# Patient Record
Sex: MALE | Race: WHITE | NOT HISPANIC OR LATINO | Employment: FULL TIME | URBAN - METROPOLITAN AREA
[De-identification: names, ages, dates, MRNs, and addresses within clinical notes are randomized per-mention and may not be internally consistent; named-entity substitution may affect disease eponyms.]

---
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
## 2020-03-15 ENCOUNTER — RECORDS - HEALTHEAST (OUTPATIENT)
Dept: ADMINISTRATIVE | Facility: OTHER | Age: 20
End: 2020-03-15

## 2021-05-27 ENCOUNTER — RECORDS - HEALTHEAST (OUTPATIENT)
Dept: ADMINISTRATIVE | Facility: CLINIC | Age: 21
End: 2021-05-27

## 2023-10-07 ENCOUNTER — HOSPITAL ENCOUNTER (EMERGENCY)
Facility: HOSPITAL | Age: 23
Discharge: HOME | End: 2023-10-07
Attending: EMERGENCY MEDICINE | Admitting: EMERGENCY MEDICINE
Payer: COMMERCIAL

## 2023-10-07 VITALS
OXYGEN SATURATION: 98 % | DIASTOLIC BLOOD PRESSURE: 69 MMHG | HEART RATE: 73 BPM | WEIGHT: 145 LBS | SYSTOLIC BLOOD PRESSURE: 140 MMHG | HEIGHT: 71 IN | BODY MASS INDEX: 20.3 KG/M2 | RESPIRATION RATE: 20 BRPM | TEMPERATURE: 97.7 F

## 2023-10-07 DIAGNOSIS — S61.215A LACERATION OF LEFT RING FINGER WITHOUT FOREIGN BODY WITHOUT DAMAGE TO NAIL, INITIAL ENCOUNTER: ICD-10-CM

## 2023-10-07 PROCEDURE — 99283 EMERGENCY DEPT VISIT LOW MDM: CPT

## 2023-10-07 PROCEDURE — 250N000009 HC RX 250: Performed by: EMERGENCY MEDICINE

## 2023-10-07 PROCEDURE — 12002 RPR S/N/AX/GEN/TRNK2.6-7.5CM: CPT

## 2023-10-07 RX ORDER — GINSENG 100 MG
CAPSULE ORAL ONCE
Status: COMPLETED | OUTPATIENT
Start: 2023-10-07 | End: 2023-10-07

## 2023-10-07 RX ADMIN — Medication: at 18:06

## 2023-10-07 RX ADMIN — BACITRACIN: 500 OINTMENT TOPICAL at 19:42

## 2023-10-07 ASSESSMENT — ACTIVITIES OF DAILY LIVING (ADL): ADLS_ACUITY_SCORE: 35

## 2023-10-07 NOTE — ED NOTES
Wound visualized, deep wound with skin flap, bleeding controlled. Pain with finger manipulation.Saline flushed over wound and gauze applied to area for covering until provider assesses.

## 2023-10-07 NOTE — ED TRIAGE NOTES
Pt was working on cutting sheet rock in his garage when the razor blade he was using slipped and cut his left ring finger. Pt applied pressure and a dressing that remains intact. Pt feels a little shaky and anxious.      Triage Assessment       Row Name 10/07/23 4354       Triage Assessment (Adult)    Airway WDL WDL       Respiratory WDL    Respiratory WDL WDL       Skin Circulation/Temperature WDL    Skin Circulation/Temperature WDL X  Laceration to left 4th digit       Cognitive/Neuro/Behavioral WDL    Cognitive/Neuro/Behavioral WDL WDL

## 2023-10-07 NOTE — ED PROVIDER NOTES
"EMERGENCY DEPARTMENT NOTE     Name: Santiago Dent    Age/Sex: 23 year old male   MRN: [id number]   Evaluation Date & Time:  10/7/2023  5:27 PM    PCP:    No primary care provider on file.   ED Provider: Sahil Faust D.O.       CHIEF COMPLAINT    Laceration       DIAGNOSIS & DISPOSITION/MEDICAL DECISION MAKING     1. Laceration of left ring finger without foreign body without damage to nail, initial encounter        Santiago Dent is a 23 year old male with no relevant past history who presents to the emergency department for evaluation of a left ring finger laceration.      Medical Decision Making  Serration was repaired as per procedure note  Interventions: Laceration repair  Discharge Vital Signs:BP (!) 140/69   Pulse 73   Temp 97.7  F (36.5  C) (Oral)   Resp 20   Ht 1.803 m (5' 11\")   Wt 65.8 kg (145 lb)   SpO2 98%   BMI 20.22 kg/m       DISPOSITION: Home    Diagnostic studies:  Imaging:  No orders to display      Lab:  Labs Ordered and Resulted from Time of ED Arrival to Time of ED Departure - No data to display          Triage note reviewed:Pt was working on cutting sheet rock in his garage when the razor blade he was using slipped and cut his left ring finger. Pt applied pressure and a dressing that remains intact. Pt feels a little shaky and anxious.      Triage Assessment       Row Name 10/07/23 2676       Triage Assessment (Adult)    Airway WDL WDL       Respiratory WDL    Respiratory WDL WDL       Skin Circulation/Temperature WDL    Skin Circulation/Temperature WDL X  Laceration to left 4th digit       Cognitive/Neuro/Behavioral WDL    Cognitive/Neuro/Behavioral WDL WDL                  Medical Decision Making    History:  Supplemental history from: Stepfather  External Record(s) reviewed: Clinic note from May 31, 2018    Work Up:  Chart documentation includes differential considered and any EKGs or imaging independently interpreted by provider, where specified.  In additional to work up " documented, I considered the following work up: Documented in chart, if applicable.    External consultation:  Discussion of management with another provider: Documented in chart, if applicable    Complicating factors:  Care impacted by chronic illness: N/A  Care affected by social determinants of health: N/A    Disposition considerations: Discharge. I prescribed additional prescription strength medication(s) as charted. See documentation for any additional details.      At the conclusion of the encounter I discussed the results of all of the tests and the disposition. The questions were answered. The patient or family acknowledged understanding and was agreeable with the care plan.    TOTAL CRITICAL CARE TIME (EXCLUDING PROCEDURES): Not applicable    PROCEDURES:     PROCEDURE: Digital Block   INDICATIONS: Laceration Repair   PROCEDURE PROVIDER: Dr Sahil Faust   SITE: Left ring finger (4th digit)   MEDICATION: 6 mL of 1% Lidocaine without epinephrine   NOTE: The skin overlying the site for injection was prepped with chlorhexidine.  Needle was inserted in a standard three point injection pattern.  Each time the area was aspirated and there was no return of blood.  I then injected the medication at the base of the digit.  The patient had good response to the procedure   COMPLICATIONS: Patient tolerated procedure well, without complication       PROCEDURE: Laceration Repair   INDICATIONS: Laceration   PROCEDURE PROVIDER: Dr Sahil Faust   SITE: Left ring finger   TYPE/SIZE: simple, clean, and no foreign body visualized  3 cm (total length)   FUNCTIONAL ASSESSMENT: Distal sensation, circulation, and motor intact   MEDICATION: Dental block as per procedure note   PREPARATION: irrigation with Normal saline   DEBRIDEMENT: no debridement   CLOSURE:  Superficial layer closed with 8 stitches of 4-0 Prolene simple interrupted    Total number of sutures/staples placed: 8       EMERGENCY DEPARTMENT COURSE   5:50 PM I met  with the patient to gather history and to perform my initial exam.  We discussed treatment options and the plan for care while in the Emergency Department.  6:40 PM Reevaluated the patient. Performed laceration repair.  7:53 PM Reevaluated the patient. Discussed plan for discharge.    ED INTERVENTIONS     Medications   lido-EPINEPHrine-tetracaine (LET) topical gel GEL ( Topical $Given 10/7/23 1806)   bacitracin ointment ( Topical $Given 10/7/23 1942)       DISCHARGE MEDICATIONS        Review of your medicines        UNREVIEWED medicines. Ask your doctor about these medicines        Dose / Directions   ibuprofen 200 MG tablet  Commonly known as: ADVIL/MOTRIN      Dose: 200 mg  [IBUPROFEN (ADVIL,MOTRIN) 200 MG TABLET] Take 200 mg by mouth every 6 (six) hours as needed for pain.  Refills: 0     methylphenidate 30 MG/9HR patch  Commonly known as: DAYTRANA  Used for: Adjustment disorder with mixed disturbance of emotions and conduct      Dose: 1 patch  [METHYLPHENIDATE (DAYTRANA) 30 MG/9 HR] Place 1 patch on the skin daily. wear patch for about 9 hours only each day  Quantity: 30 patch  Refills: 0              INFORMATION SOURCE AND LIMITATIONS    History/Exam limitations: None  Patient information was obtained from: patient  Use of : N/A    HISTORY OF PRESENT ILLNESS   Santiago Dent is a 23 year old year old male with no relevant past history who presents to this ED by private car with  for evaluation of a left ringer finger laceration. Just prior to arrival, patient reports he was cutting sheet rock in his garage with a utility knife when it slipped and cut his left ring finger. He applied pressure and dressing that remained intact on arrival. He is able to bend the finger. Reports last Tdap was in 2018. Denies any other injuries or concerns.      REVIEW OF SYSTEMS:   All other systems reviewed and are negative except as noted above in HPI.    PATIENT HISTORY   History reviewed. No pertinent past  "medical history.  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 No pertinent surgical history.    Allergies   Allergen Reactions    Amoxicillin        OUTPATIENT MEDICATIONS     Discharge Medication List as of 10/7/2023  7:43 PM         Vitals:    10/07/23 1724 10/07/23 1942 10/07/23 1945   BP: 135/67 (!) 140/69 (!) 140/69   Pulse: 73     Resp: 20     Temp: 97.7  F (36.5  C)     TempSrc: Oral     SpO2: 98%     Weight: 65.8 kg (145 lb)     Height: 1.803 m (5' 11\")         Physical Exam   Constitutional: Oriented to person, place, and time. Appears well-developed and well-nourished.   HEENT:    Head: Atraumatic.   Neck: Normal range of motion. Neck supple.   Cardiovascular: Normal rate, regular rhythm and normal heart sounds.    Pulmonary/Chest: Normal effort  and breath sounds normal.   Abdominal: Soft. Bowel sounds are normal.   Musculoskeletal: Normal range of motion.   Neurological: Alert and oriented to person, place, and time. Normal strength. No sensory deficit. No cranial nerve deficit.  Skin: Skin is warm and dry.   Psychiatric: Normal mood and affect. Behavior is normal. Thought content normal.     DIAGNOSTICS    LABORATORY FINDINGS (REVIEWED AND INTERPRETED):  Labs Ordered and Resulted from Time of ED Arrival to Time of ED Departure - No data to display    IMAGING (REVIEWED AND INTERPRETED):  No orders to display       ECG (REVIEWED AND INTERPRETED):   None.      I, Leander Villarreal, am serving as a scribe to document services personally performed by Sahil Faust D.O., based on my observation and the provider s statements to me.    ISahil D.O., attest that Leander Villarreal is acting in a scribe capacity, has observed my performance of the services and has documented them in accordance with my direction.    Sahil Faust D.O.  EMERGENCY MEDICINE "   10/07/23  [facility] EMERGENCY DEPARTMENT  [facility] [facility] [address]  [phone]  Dept: [phone]     Sahil Faust DO  10/09/23 0259

## 2023-10-08 NOTE — DISCHARGE INSTRUCTIONS
Take ibuprofen 400 mg every 8 hours and Tylenol 650 m g every 6 hours for pain management.  Cleanse the wound daily and apply bacitracin.  Sutures should be removed and 9 days.  If signs of infection redness swelling or drainage return to the emergency department.

## 2023-10-09 ENCOUNTER — NURSE TRIAGE (OUTPATIENT)
Dept: NURSING | Facility: CLINIC | Age: 23
End: 2023-10-09
Payer: COMMERCIAL

## 2023-10-09 NOTE — TELEPHONE ENCOUNTER
Nurse Triage SBAR   No PCP/new patient.    Is this a 2nd Level Triage? NO    Situation:   Spoke with 23 yr old Santiago who has questions about care of 8 sutures on the L ring finger; 4 on each side.    Patient is wondering about showering and dressing changes.    Patient denies signs of infection, redness, increased pain or pus like drainage.    Patient states the finger is feeling okay/normal at this time.    Consent: not needed    Background:   Seen at [facility] ED 2 days ago.    Assessment:   Information only on suture care.  Symptoms stable.    Protocol Recommended Disposition:   Home care    Recommendation:   Advised Home Care per protocol.  ED after visit summary was as follows:   Take ibuprofen 400 mg every 8 hours and Tylenol 650 m g every 6 hours  for pain management. Cleanse the wound daily and apply bacitracin.  Sutures should be removed and 9 days.  If signs of infection redness swelling or drainage return to the  emergency department.  Additional care advice per Suture or Staple Questions.    CARE OF A SUTURED OR STAPLED WOUND: * KEEP THE WOUND DRY for first 24 hours (use sponge bath). You can get the wound wet (but no bathing or swimming) after 24 hours. * CLEAN THE WOUND with soap and warm water once a day, or if the wound gets dirty. * Put a small amount of ANTIBIOTIC OINTMENT on the wound once a day. Cover it with an adhesive bandage (such as a Band-Aid) or a dressing. Change daily or if it becomes wet. You can get this over-the-counter (OTC) at a drugstore. Use Bacitracin ointment (OTC in [place] or Polysporin ointment (OTC in [place]) or one that you already have. * CHANGE THE DRESSING if it gets dirty or wet. A dressing is no longer needed when edge of wound closed (usually 48 hours). A dressing is sometimes helpful to keep sutures from catching on your clothing.     Advised to call back if increasing redness, fever, or drainage occur.      Transferred patient to scheduling for follow up suture  removal appointment.      Purvi Subramanian RN  [facility]          Purvi Subramanian RN 2:34 PM 10/9/2023  Reason for Disposition   Care of sutured (or stapled) wound, questions about    Additional Information   Negative: Major abdominal surgical wound and visible internal organs   Negative: Sounds like a life-threatening emergency to the triager   Negative: Surgical incision symptoms and questions   Negative: Wound looks infected   Negative: New cut and caller wonders if it needs stitches   Negative: Bleeding won't stop after 10 minutes of direct pressure (using correct technique)   Negative: Wound gaping open after sutures (or staples) AND < 48 hours since wound re-opened   Negative: Patient sounds very sick or weak to the triager   Negative: Wound gaping open after sutures (or staples) AND > 48 hours since wound re-opened AND length of opening > 1/2 inch (12 mm)   Negative: Face wound gaping open after sutures (or staples) AND > 48 hours since wound re-opened AND length of opening > 1/4 inch (6 mm)   Negative: Suture or staple removal is overdue   Negative: Suture (or staple) came out early and > 48 hours since sutures placed, and caller wants wound checked   Negative: Patient wants to be seen   Negative: Numbness extends beyond the wound edges and lasts > 8 hours   Negative: Suture (or staple) came out early, and wound not gaping or gaping slightly    Protocols used: Suture or Staple [id number]

## 2023-10-11 ENCOUNTER — NURSE TRIAGE (OUTPATIENT)
Dept: NURSING | Facility: CLINIC | Age: 23
End: 2023-10-11
Payer: COMMERCIAL

## 2023-10-12 NOTE — TELEPHONE ENCOUNTER
Patient reporting he had stitches placed Saturday night on left finger, and has been using antibiotic ointment from individual packages, but not sure if . He says there is some new light yellow drainage in the corner of wound, and finger tip still feels numb.  Denies fever, pain.    Disposition is to see provider within 24 hours. Patient verbalizes understanding of care advice and agrees with plan.    Farhana Kaufman RN  [facility]      Reason for Disposition   [1] Clear or blood-tinged fluid draining from wound AND [2] no fever    Additional Information   Negative: [1] Major abdominal surgical incision AND [2] wound gaping open AND [3] visible internal organs   Negative: Sounds like a life-threatening emergency to the triager   Negative: Patient has a Negative Pressure Wound Therapy device   Negative: Patient is followed by a wound clinic or wound specialist for this wound   Negative: [1] Bleeding from incision AND [2] won't stop after 10 minutes of direct pressure   Negative: [1] Bleeding (more than a few drops) from incision AND [2] tracheostomy or blood vessel surgery (e.g., carotid endarterectomy, femoral bypass graft, kidney dialysis fistula)   Negative: [1] Widespread rash AND [2] bright red, sunburn-like   Negative: Severe pain in the incision   Negative: [1] Incision gaping open AND [2] < 48 hours since wound re-opened   Negative: [1] Incision gaping open AND [2] length of opening > 2 inches (5 cm)   Negative: Patient sounds very sick or weak to the triager   Negative: Sounds like a serious complication to the triager   Negative: Fever > 100.4 F (38.0 C)   Negative: [1] Incision looks infected (spreading redness, pain) AND [2] fever > 99.5 F (37.5 C)   Negative: [1] Incision looks infected (spreading redness, pain) AND [2] large red area (> 2 in. or 5 cm)   Negative: [1] Incision looks infected (spreading redness, pain) AND [2] face wound   Negative: [1] Red streak runs from the incision  AND [2] longer than 1 inch (2.5 cm)   Negative: [1] Pus or bad-smelling fluid draining from incision AND [2] no fever   Negative: [1] Post-op pain AND [2] not controlled with pain medications   Negative: Dressing soaked with blood or body fluid (e.g., drainage)   Negative: [1] Scant bleeding (e.g., few drops) from incision AND AND [2] tracheostomy or blood vessel surgery (e.g., carotid endarterectomy, femoral bypass graft, kidney dialysis fistula)   Negative: [1] Raised bruise and [2] size > 2 inches (5 cm) and expanding   Negative: [1] Caller has URGENT question AND [2] triager unable to answer question   Negative: [1] INCREASING pain in incision AND [2] > 2 days (48 hours) since surgery   Negative: [1] Small red area or streak AND [2] no fever    Protocols used: Post-Op Incision Symptoms and [id number]

## 2023-10-16 ENCOUNTER — NURSE TRIAGE (OUTPATIENT)
Dept: NURSING | Facility: CLINIC | Age: 23
End: 2023-10-16

## 2023-10-16 NOTE — TELEPHONE ENCOUNTER
Patient calling reports taking one of his stitches out and wants to remove the stitches. Advised per protocol to keep appointment for today to remove stitches with patient reporting it is a waste of time and that he would like to remove them himself. Advised to check for infection and safe removal would advise having these taken out in the clinic. Patient to contact insurance regarding the cost of removal and cancel appointment if necessary.     Adelaida Morales RN 10/16/23 2:14 AM    [facility]        Reason for Disposition   [1] Suture (or staple) came out early AND [2] wound not gaping or gaping slightly    Additional Information   Negative: [1] Major abdominal surgical wound AND [2] visible internal organs   Negative: Sounds like a life-threatening emergency to the triager   Negative: Surgical incision symptoms and questions   Negative: New cut and caller wonders if it needs stitches   Negative: Skin glue (Dermabond) questions   Negative: Wound looks infected   Negative: [1] Bleeding from wound AND [2] won't stop after 10 minutes of direct pressure (using correct technique)   Negative: [1] Suture (or staple) came out early AND [2] > 48 hours since sutures placed AND [3] caller wants wound checked   Negative: [1] Numbness extends beyond the wound edges AND [2] lasts > 8 hours   Negative: [1] Wound gaping open after sutures (or staples) AND [2] > 48 hours since wound re-opened AND [3] length of opening > 1/2 inch (12 mm)   Negative: [1] Face wound gaping open after sutures (or staples) AND [2] > 48 hours since wound re-opened AND [3] length of opening > 1/4 inch (6 mm)   Negative: Suture or staple removal is overdue   Negative: [1] Wound gaping open after sutures (or staples) AND [2] < 48 hours since wound re-opened   Negative: Patient sounds very sick or weak to the triager    Protocols used: Suture or Staple [id number]

## 2023-10-16 NOTE — TELEPHONE ENCOUNTER
Patient calling. He's been removing sutures himself, and there are 4 sutures left. He states that he would like to remove the remaining sutures himself in a few days. Patient was strongly cautioned not to do so, and the risk for infection and further injury was explained to him.     Care advice given for home care for the area he had already removed the sutures from on his hand.     Tiny Prescott RN  [facility]  October 16, 2023, 4:01 PM    Reason for Disposition   Suture (or staple) came out early, and wound not gaping or gaping slightly    Additional Information   Negative: Major abdominal surgical wound and visible internal organs   Negative: Sounds like a life-threatening emergency to the triager   Negative: Surgical incision symptoms and questions   Negative: Wound looks infected   Negative: New cut and caller wonders if it needs stitches   Negative: Bleeding won't stop after 10 minutes of direct pressure (using correct technique)   Negative: Wound gaping open after sutures (or staples) AND < 48 hours since wound re-opened   Negative: Patient sounds very sick or weak to the triager   Negative: Wound gaping open after sutures (or staples) AND > 48 hours since wound re-opened AND length of opening > 1/2 inch (12 mm)   Negative: Face wound gaping open after sutures (or staples) AND > 48 hours since wound re-opened AND length of opening > 1/4 inch (6 mm)   Negative: Suture or staple removal is overdue   Negative: Suture (or staple) came out early and > 48 hours since sutures placed, and caller wants wound checked   Negative: Patient wants to be seen   Negative: Numbness extends beyond the wound edges and lasts > 8 hours    Protocols used: Suture or Staple [id number]

## 2024-03-03 ENCOUNTER — HEALTH MAINTENANCE LETTER (OUTPATIENT)
Age: 24
End: 2024-03-03

## 2025-03-15 ENCOUNTER — HEALTH MAINTENANCE LETTER (OUTPATIENT)
Age: 25
End: 2025-03-15